# Patient Record
Sex: FEMALE | Race: ASIAN
[De-identification: names, ages, dates, MRNs, and addresses within clinical notes are randomized per-mention and may not be internally consistent; named-entity substitution may affect disease eponyms.]

---

## 2018-09-18 ENCOUNTER — HOSPITAL ENCOUNTER (EMERGENCY)
Dept: HOSPITAL 89 - ER | Age: 33
LOS: 1 days | Discharge: HOME | End: 2018-09-19
Payer: COMMERCIAL

## 2018-09-18 DIAGNOSIS — Z3A.01: ICD-10-CM

## 2018-09-18 DIAGNOSIS — O02.9: Primary | ICD-10-CM

## 2018-09-18 PROCEDURE — 82040 ASSAY OF SERUM ALBUMIN: CPT

## 2018-09-18 PROCEDURE — 84450 TRANSFERASE (AST) (SGOT): CPT

## 2018-09-18 PROCEDURE — 84075 ASSAY ALKALINE PHOSPHATASE: CPT

## 2018-09-18 PROCEDURE — 84460 ALANINE AMINO (ALT) (SGPT): CPT

## 2018-09-18 PROCEDURE — 84520 ASSAY OF UREA NITROGEN: CPT

## 2018-09-18 PROCEDURE — 82247 BILIRUBIN TOTAL: CPT

## 2018-09-18 PROCEDURE — 83690 ASSAY OF LIPASE: CPT

## 2018-09-18 PROCEDURE — 82565 ASSAY OF CREATININE: CPT

## 2018-09-18 PROCEDURE — 85025 COMPLETE CBC W/AUTO DIFF WBC: CPT

## 2018-09-18 PROCEDURE — 84295 ASSAY OF SERUM SODIUM: CPT

## 2018-09-18 PROCEDURE — 76817 TRANSVAGINAL US OBSTETRIC: CPT

## 2018-09-18 PROCEDURE — 82947 ASSAY GLUCOSE BLOOD QUANT: CPT

## 2018-09-18 PROCEDURE — 84155 ASSAY OF PROTEIN SERUM: CPT

## 2018-09-18 PROCEDURE — 82310 ASSAY OF CALCIUM: CPT

## 2018-09-18 PROCEDURE — 82435 ASSAY OF BLOOD CHLORIDE: CPT

## 2018-09-18 PROCEDURE — 82374 ASSAY BLOOD CARBON DIOXIDE: CPT

## 2018-09-18 PROCEDURE — 96360 HYDRATION IV INFUSION INIT: CPT

## 2018-09-18 PROCEDURE — 84702 CHORIONIC GONADOTROPIN TEST: CPT

## 2018-09-18 PROCEDURE — 81001 URINALYSIS AUTO W/SCOPE: CPT

## 2018-09-18 PROCEDURE — 99284 EMERGENCY DEPT VISIT MOD MDM: CPT

## 2018-09-18 PROCEDURE — 84132 ASSAY OF SERUM POTASSIUM: CPT

## 2018-09-18 NOTE — ER REPORT
History and Physical


Time Seen By MD:  23:18


HPI/ROS


CHIEF COMPLAINT: Vaginal bleeding





HISTORY OF PRESENT ILLNESS: Patient is a 33-year-old female here with complaints


of vaginal bleeding, lower back cramping which started shortly prior to arrival 


in the setting of 9 weeks pregnancy. Patient reports that last week she had an 


ultrasound the OB/GYN at which time there is no fetal heartbeat noticed that a 


yolk sack was present in the uterine cavity. This is the patient's 2nd 


pregnancy, the 1st pregnancy ended in a miscarriage at approximately 15 weeks' 


gestation. Patient noted having a pink tinge to her urine which prompted 


evaluation today. Patient denies abdominal pain, fevers, chills, chest pain, 


shortness breath, nausea, vomiting.





REVIEW OF SYSTEMS:


Constitutional: No fever, no chills.


Eyes: No discharge.


ENT: No sore throat. 


Cardiovascular: No chest pain, no palpitations.


Respiratory: No cough, no shortness of breath.


Gastrointestinal: No abdominal pain, no vomiting.


Genitourinary: + hematuria


Musculoskeletal: + low back cramping pain.


Skin: No rashes.


Neurological: No headache.


Allergies:  


Coded Allergies:  


     No Known Drug Allergies (Unverified , 18)


Home Meds


Reported Medications


Prenatal Vits W-Ca,Fe,Fa(<1MG) (PRENATAL VITAMINS) 1 Each Tablet, 1 EACH PO 


DAILY, TAB


   18


Calcium Carbonate (TUMS) 200 Mg Tab.chew, 200 MG PO, TAB.CHEW


   18


Constitutional





Vital Sign - Last 24 Hours








 18





 23:14 23:19 23:20 23:29


 


Temp  98.6  


 


Pulse ??? 75  88


 


Resp  14  


 


B/P (MAP)  120/69 120/69 (86) 


 


Pulse Ox  98  98


 


O2 Delivery  Room Air  


 


    





 18





 23:30 23:44 23:59 00:00


 


Pulse  77 ??? 


 


B/P (MAP) 125/68 (87) 98/59 (72)  92/57 (69)


 


Pulse Ox  96  





 18





 00:14 00:29 00:30 00:44


 


Pulse ??? ???  73


 


B/P (MAP)   92/57 (69) 


 


Pulse Ox  99  98





 18





 00:49 01:00 01:04 01:34


 


Pulse ???  ??? ???


 


B/P (MAP)  ???/??? (1665)  





 18





 02:00 02:01 02:04 02:19


 


Pulse   73 76


 


B/P (MAP) 92/34 (53) 91/53 (66)  


 


Pulse Ox   96 98














Intake and Output   


 


 18





 15:00 23:00 07:00


 


Intake Total   1000 ml


 


Balance   1000 ml








Physical Exam


   General Appearance: The patient is alert, has no immediate need for airway 


protection and no signs of toxicity. No acute distress


Eyes: Pupils equal and round no pallor or injection.


ENT, Mouth: Mucous membranes are moist.


Respiratory: There are no retractions, lungs are clear to auscultation.


Cardiovascular: Regular rate and rhythm. 


Gastrointestinal:  Abdomen is soft and non tender, no masses, bowel sounds 


normal.


Neurological: No focal neurological deficits


Skin: Warm and dry, no rashes.


Musculoskeletal:  Neck is supple non tender.


      Extremities are nontender, nonswollen and have full range of motion.





DIFFERENTIAL DIAGNOSIS: After history and physical exam differential diagnosis 


was considered for hematuria, dysfunctional uterine bleeding, miscarriage, 


threatened 





Medical Decision Making


Data Points


Result Diagram:  


18 7000                                                                    


           18 2354





Laboratory





Hematology








Test


 18


23:20 18


23:54


 


Urine Color Colorless  


 


Urine Clarity Clear  


 


Urine pH


 6.0 pH


(4.8-9.5) 





 


Urine Specific Gravity 1.003  


 


Urine Protein


 Negative mg/dL


(NEGATIVE) 





 


Urine Glucose (UA)


 Negative mg/dL


(NEGATIVE) 





 


Urine Ketones


 Negative mg/dL


(NEGATIVE) 





 


Urine Blood


 Small


(NEGATIVE) 





 


Urine Nitrite


 Negative


(NEGATIVE) 





 


Urine Bilirubin


 Negative


(NEGATIVE) 





 


Urine Urobilinogen


 Negative mg/dL


(0.2-1.9) 





 


Urine Leukocyte Esterase


 Negative


(NEGATIVE) 





 


Urine RBC


 None /HPF


(0-2/HPF) 





 


Urine WBC


 <1 /HPF


(0-5/HPF) 





 


Urine Squamous Epithelial


Cells Many /LPF


(</=FEW) 





 


Urine Bacteria


 Negative /HPF


(NONE-FEW) 





 


Urine Mucus


 None /HPF


(NONE-FEW) 





 


Red Blood Count


 


 4.49 M/uL


(4.17-5.56)


 


Mean Corpuscular Volume


 


 91.3 fL


(80.0-96.0)


 


Mean Corpuscular Hemoglobin


 


 31.6 pg


(26.0-33.0)


 


Mean Corpuscular Hemoglobin


Concent 


 34.6 g/dL


(32.0-36.0)


 


Red Cell Distribution Width


 


 12.9 %


(11.5-14.5)


 


Mean Platelet Volume


 


 6.9 fL


(7.2-11.1)


 


Neutrophils (%) (Auto)


 


 55.6 %


(39.4-72.5)


 


Lymphocytes (%) (Auto)


 


 34.6 %


(17.6-49.6)


 


Monocytes (%) (Auto)


 


 5.7 %


(4.1-12.4)


 


Eosinophils (%) (Auto)


 


 2.8 %


(0.4-6.7)


 


Basophils (%) (Auto)


 


 1.3 %


(0.3-1.4)


 


Nucleated RBC Relative Count


(auto) 


 0.1 /100WBC 





 


Neutrophils # (Auto)


 


 4.9 K/uL


(2.0-7.4)


 


Lymphocytes # (Auto)


 


 3.0 K/uL


(1.3-3.6)


 


Monocytes # (Auto)


 


 0.5 K/uL


(0.3-1.0)


 


Eosinophils # (Auto)


 


 0.2 K/uL


(0.0-0.5)


 


Basophils # (Auto)


 


 0.1 K/uL


(0.0-0.1)


 


Nucleated RBC Absolute Count


(auto) 


 0.01 K/uL 





 


Sodium Level


 


 139 mmol/L


(137-145)


 


Potassium Level


 


 3.4 mmol/L


(3.5-5.0)


 


Chloride Level


 


 103 mmol/L


()


 


Carbon Dioxide Level


 


 22 mmol/L


(22-31)


 


Blood Urea Nitrogen  7 mg/dl (7-18) 


 


Creatinine


 


 0.60 mg/dl


(0.52-1.04)


 


Glomerular Filtration Rate


Calc 


 > 60.0 





 


Random Glucose


 


 91 mg/dl


()


 


Calcium Level


 


 9.4 mg/dl


(8.4-10.2)


 


Total Bilirubin


 


 0.3 mg/dl


(0.2-1.3)


 


Aspartate Amino Transf


(AST/SGOT) 


 33 U/L (0-35) 





 


Alanine Aminotransferase


(ALT/SGPT) 


 21 U/L (0-56) 





 


Alkaline Phosphatase  51 U/L (0-126) 


 


Total Protein


 


 7.8 g/dl


(6.3-8.2)


 


Albumin


 


 4.4 g/dl


(3.5-5.0)


 


Lipase


 


 69 U/L


()


 


Human Chorionic Gonadotropin,


Quant 


 63797 mIU/ml 











Chemistry








Test


 18


23:20 18


23:54


 


Urine Color Colorless  


 


Urine Clarity Clear  


 


Urine pH


 6.0 pH


(4.8-9.5) 





 


Urine Specific Gravity 1.003  


 


Urine Protein


 Negative mg/dL


(NEGATIVE) 





 


Urine Glucose (UA)


 Negative mg/dL


(NEGATIVE) 





 


Urine Ketones


 Negative mg/dL


(NEGATIVE) 





 


Urine Blood


 Small


(NEGATIVE) 





 


Urine Nitrite


 Negative


(NEGATIVE) 





 


Urine Bilirubin


 Negative


(NEGATIVE) 





 


Urine Urobilinogen


 Negative mg/dL


(0.2-1.9) 





 


Urine Leukocyte Esterase


 Negative


(NEGATIVE) 





 


Urine RBC


 None /HPF


(0-2/HPF) 





 


Urine WBC


 <1 /HPF


(0-5/HPF) 





 


Urine Squamous Epithelial


Cells Many /LPF


(</=FEW) 





 


Urine Bacteria


 Negative /HPF


(NONE-FEW) 





 


Urine Mucus


 None /HPF


(NONE-FEW) 





 


White Blood Count


 


 8.8 k/uL


(4.5-11.0)


 


Red Blood Count


 


 4.49 M/uL


(4.17-5.56)


 


Hemoglobin


 


 14.2 g/dL


(12.0-16.0)


 


Hematocrit


 


 41.0 %


(34.0-47.0)


 


Mean Corpuscular Volume


 


 91.3 fL


(80.0-96.0)


 


Mean Corpuscular Hemoglobin


 


 31.6 pg


(26.0-33.0)


 


Mean Corpuscular Hemoglobin


Concent 


 34.6 g/dL


(32.0-36.0)


 


Red Cell Distribution Width


 


 12.9 %


(11.5-14.5)


 


Platelet Count


 


 278 K/uL


(150-450)


 


Mean Platelet Volume


 


 6.9 fL


(7.2-11.1)


 


Neutrophils (%) (Auto)


 


 55.6 %


(39.4-72.5)


 


Lymphocytes (%) (Auto)


 


 34.6 %


(17.6-49.6)


 


Monocytes (%) (Auto)


 


 5.7 %


(4.1-12.4)


 


Eosinophils (%) (Auto)


 


 2.8 %


(0.4-6.7)


 


Basophils (%) (Auto)


 


 1.3 %


(0.3-1.4)


 


Nucleated RBC Relative Count


(auto) 


 0.1 /100WBC 





 


Neutrophils # (Auto)


 


 4.9 K/uL


(2.0-7.4)


 


Lymphocytes # (Auto)


 


 3.0 K/uL


(1.3-3.6)


 


Monocytes # (Auto)


 


 0.5 K/uL


(0.3-1.0)


 


Eosinophils # (Auto)


 


 0.2 K/uL


(0.0-0.5)


 


Basophils # (Auto)


 


 0.1 K/uL


(0.0-0.1)


 


Nucleated RBC Absolute Count


(auto) 


 0.01 K/uL 





 


Glomerular Filtration Rate


Calc 


 > 60.0 





 


Calcium Level


 


 9.4 mg/dl


(8.4-10.2)


 


Total Bilirubin


 


 0.3 mg/dl


(0.2-1.3)


 


Aspartate Amino Transf


(AST/SGOT) 


 33 U/L (0-35) 





 


Alanine Aminotransferase


(ALT/SGPT) 


 21 U/L (0-56) 





 


Alkaline Phosphatase  51 U/L (0-126) 


 


Total Protein


 


 7.8 g/dl


(6.3-8.2)


 


Albumin


 


 4.4 g/dl


(3.5-5.0)


 


Lipase


 


 69 U/L


()


 


Human Chorionic Gonadotropin,


Quant 


 20580 mIU/ml 











Urinalysis








Test


 18


23:20


 


Urine Color Colorless 


 


Urine Clarity Clear 


 


Urine pH


 6.0 pH


(4.8-9.5)


 


Urine Specific Gravity 1.003 


 


Urine Protein


 Negative mg/dL


(NEGATIVE)


 


Urine Glucose (UA)


 Negative mg/dL


(NEGATIVE)


 


Urine Ketones


 Negative mg/dL


(NEGATIVE)


 


Urine Blood


 Small


(NEGATIVE)


 


Urine Nitrite


 Negative


(NEGATIVE)


 


Urine Bilirubin


 Negative


(NEGATIVE)


 


Urine Urobilinogen


 Negative mg/dL


(0.2-1.9)


 


Urine Leukocyte Esterase


 Negative


(NEGATIVE)


 


Urine RBC


 None /HPF


(0-2/HPF)


 


Urine WBC


 <1 /HPF


(0-5/HPF)


 


Urine Squamous Epithelial


Cells Many /LPF


(</=FEW)


 


Urine Bacteria


 Negative /HPF


(NONE-FEW)


 


Urine Mucus


 None /HPF


(NONE-FEW)











EKG/Imaging


Imaging


 


EXAMINATION: FIRST TRIMESTER TRANSABDOMINAL AND TRANSVAGINAL ULTRASOUND WITH 


DOPPLER


 


DATE: 2018 1:26 AM


 


INDICATION: Pregnant, vaginal bleeding.


 


TECHNIQUE: Transabdominal and transvaginal grayscale, color, and pulsed Doppler 


examination of the uterus and adnexa was performed.


 


COMPARISON: None.


 


FINDINGS:


 


LMP: 2018.


Estimated gestational age by LMP: 9 weeks 2 days.  Estimated date of delivery by


LMP: 2019


 


The uterus is anteverted and anteflexed. There is a single fetus within a single


gestational sac. The crown-rump length of the fetus measures 4.10 mm, consistent


with a gestational age of 6 weeks 1 day.  No fetal heart tones detected. There 


is no definite evidence of subchorionic hemorrhage.  There is a small volume of 


fluid in the endocervical canal.  Ill-defined heterogeneous ovoid lesion in the 


left myometrium near the fundus may represent a leiomyoma and measures 1.3 x 2.2


x 1.9 cm.


 


The right ovary measures 3.0 x 1.8 x 2.3 cm and demonstrates normal follicles 


and normal spectral waveform of the arterial and venous blood flow. The left 


ovary is suboptimally visualized.


 


There is no free fluid or abnormal adnexal mass in the pelvic cavity.


 


IMPRESSION:


 


1.  Single intrauterine pregnancy with a crown-rump consistent with gestational 


age of 6 weeks 1 day with an estimated date of delivery of 2019, compared 


to the estimated gestational age by LMP of 9 weeks 2 days.  No fetal heart tones


are detected, though this could be due to early gestational age.  Recommend 


follow-up beta hCGs and imaging as indicated.


 


2.  Small volume of fluid in the endocervical canal of uncertain significance.


 


3.  Ill-defined heterogeneous ovoid lesion in the left myometrium near the 


fundus may represent a leiomyoma and measures up to 2.2 cm in diameter.





ED Course/Re-evaluation


ED Course


Patient is a 33-year-old female here with complaints of vaginal bleeding which 


occurred shortly prior to arrival. Patient has a history significant for prior 


miscarriage at approximately 15 weeks' gestation. Last week the patient had an 


OB ultrasound which showed no fetal cardiac activity. Patient reported that her 


gestational age is approximately 9 weeks. Labs were found to be unremarkable. 


Patient follows obstetrics outside of our hospital system so reference labs were


unavailable. Beta-hCG was collected and was applied to the patient and discharge


paperwork so that she can compare on follow-up lab findings in order to trend 


her beta hCG. Ultrasound today showed no cardiac activity however it did 


identify an intrauterine fetus which was consistent with 6 weeks gestation. It 


is unclear what the significance of this finding is. Patient received normal 


saline bolus of fluid for hydration. I updated the patient and the patient's 


significant other regarding lab findings and imaging findings and advised them 


to follow-up with obstetrics next week in order to trend beta-hCG and consider 


repeat imaging. Patient patient's significant other voiced understanding of 


plan.


Decision to Disposition Date:  Sep 19, 2018


Decision to Disposition Time:  02:40





Depart


Departure


Latest Vital Signs





Vital Signs








  Date Time  Temp Pulse Resp B/P (MAP) Pulse Ox O2 Delivery O2 Flow Rate FiO2


 


18 02:19  76   98   


 


18 02:01    91/53 (66)    


 


18 23:19 98.6  14   Room Air  








Impression:  


   Primary Impression:  


   Vaginal bleeding in pregnancy


Condition:  Improved


Disposition:  HOME OR SELF-CARE


Patient Instructions:  Pregnancy at 7 to 10 Weeks (ED)





Additional Instructions:  


Your lab values have been included in your discharge instructions. Please 


follow-up within the next week with here obstetrics doctor in order to further 


evaluate your pregnancy. Your doctor may consider repeating lab values or 


imaging as needed. Please return promptly if he develops fever, abdominal pain, 


nausea, vomiting.











PATRICIO PLASCENCIA DO           Sep 18, 2018 23:19

## 2018-09-19 VITALS — SYSTOLIC BLOOD PRESSURE: 91 MMHG | DIASTOLIC BLOOD PRESSURE: 53 MMHG

## 2018-09-19 LAB — PLATELET COUNT, AUTOMATED: 278 K/UL (ref 150–450)

## 2018-09-19 NOTE — RADIOLOGY IMAGING REPORT
FACILITY: [facility] - [place] 

 

PATIENT NAME: Brigido Poole

: 1985

MR: [id number]

V: [id number]

EXAM DATE: [id number]

ORDERING PHYSICIAN: PATRICIO PLASCENCIA

TECHNOLOGIST: 

 

[place] [facility] [place]

Patient: Brigido Poole

: 1985

MRN: [id number]

Visit/Account:[id number]

Date of Sevice:  2018

 

ACCESSION #: [id number]

 

 

EXAMINATION: FIRST TRIMESTER TRANSABDOMINAL AND TRANSVAGINAL ULTRASOUND WITH DOPPLER

 

DATE: 2018 1:26 AM

 

INDICATION: Pregnant, vaginal bleeding.

 

TECHNIQUE: Transabdominal and transvaginal grayscale, color, and pulsed Doppler examination of the ut
erus and adnexa was performed.

 

COMPARISON: None.

 

FINDINGS:

 

LMP: 2018.

Estimated gestational age by LMP: 9 weeks 2 days.  Estimated date of delivery by LMP: 2019

 

The uterus is anteverted and anteflexed. There is a single fetus within a single gestational sac. The
 crown-rump length of the fetus measures 4.10 mm, consistent with a gestational age of 6 weeks 1 day.
  No fetal heart tones detected. There is no definite evidence of subchorionic hemorrhage.  There is 
a small volume of fluid in the endocervical canal.  Ill-defined heterogeneous ovoid lesion in the lef
t myometrium near the fundus may represent a leiomyoma and measures 1.3 x 2.2 x 1.9 cm.

 

The right ovary measures 3.0 x 1.8 x 2.3 cm and demonstrates normal follicles and normal spectral wav
eform of the arterial and venous blood flow. The left ovary is suboptimally visualized.

 

There is no free fluid or abnormal adnexal mass in the pelvic cavity.

 

IMPRESSION:

 

1.  Single intrauterine pregnancy with a crown-rump consistent with gestational age of 6 weeks 1 day 
with an estimated date of delivery of 2019, compared to the estimated gestational age by LMP of 
9 weeks 2 days.  No fetal heart tones are detected, though this could be due to early gestational age
.  Recommend follow-up beta hCGs and imaging as indicated.

 

2.  Small volume of fluid in the endocervical canal of uncertain significance.

 

3.  Ill-defined heterogeneous ovoid lesion in the left myometrium near the fundus may represent a lei
omyoma and measures up to 2.2 cm in diameter.

 

Report Dictated By: Ranjeet Ventura MD at 2018 2:00 AM

 

Report E-Signed By: Ranjeet Ventura MD  at 2018 2:08 AM

 

WSN:[id number]